# Patient Record
Sex: FEMALE | Race: OTHER
[De-identification: names, ages, dates, MRNs, and addresses within clinical notes are randomized per-mention and may not be internally consistent; named-entity substitution may affect disease eponyms.]

---

## 2021-04-14 ENCOUNTER — HOSPITAL ENCOUNTER (EMERGENCY)
Dept: HOSPITAL 61 - ER | Age: 21
Discharge: HOME | End: 2021-04-14
Payer: SELF-PAY

## 2021-04-14 VITALS — WEIGHT: 202.83 LBS | BODY MASS INDEX: 33.79 KG/M2 | HEIGHT: 65 IN

## 2021-04-14 VITALS — DIASTOLIC BLOOD PRESSURE: 57 MMHG | SYSTOLIC BLOOD PRESSURE: 103 MMHG

## 2021-04-14 DIAGNOSIS — N83.202: Primary | ICD-10-CM

## 2021-04-14 LAB
ALBUMIN SERPL-MCNC: 3.2 G/DL (ref 3.4–5)
ALBUMIN/GLOB SERPL: 0.7 {RATIO} (ref 1–1.7)
ALP SERPL-CCNC: 117 U/L (ref 46–116)
ALT SERPL-CCNC: 17 U/L (ref 14–59)
ANION GAP SERPL CALC-SCNC: 11 MMOL/L (ref 6–14)
ANISOCYTOSIS BLD QL SMEAR: PRESENT
APTT PPP: YELLOW S
AST SERPL-CCNC: 13 U/L (ref 15–37)
BACTERIA #/AREA URNS HPF: (no result) /HPF
BASOPHILS # BLD AUTO: 0 X10^3/UL (ref 0–0.2)
BASOPHILS NFR BLD: 1 % (ref 0–3)
BILIRUB SERPL-MCNC: 0.2 MG/DL (ref 0.2–1)
BILIRUB UR QL STRIP: NEGATIVE
BUN SERPL-MCNC: 4 MG/DL (ref 7–20)
BUN/CREAT SERPL: 6 (ref 6–20)
CALCIUM SERPL-MCNC: 8.6 MG/DL (ref 8.5–10.1)
CHLORIDE SERPL-SCNC: 106 MMOL/L (ref 98–107)
CO2 SERPL-SCNC: 23 MMOL/L (ref 21–32)
CREAT SERPL-MCNC: 0.7 MG/DL (ref 0.6–1)
EOSINOPHIL NFR BLD: 0 X10^3/UL (ref 0–0.7)
EOSINOPHIL NFR BLD: 1 % (ref 0–3)
ERYTHROCYTE [DISTWIDTH] IN BLOOD BY AUTOMATED COUNT: 17.5 % (ref 11.5–14.5)
FIBRINOGEN PPP-MCNC: CLEAR MG/DL
GFR SERPLBLD BASED ON 1.73 SQ M-ARVRAT: 107.8 ML/MIN
GLUCOSE SERPL-MCNC: 86 MG/DL (ref 70–99)
HCT VFR BLD CALC: 30.4 % (ref 36–47)
HGB BLD-MCNC: 9.4 G/DL (ref 12–15.5)
HYPOCHROMIA BLD QL SMEAR: PRESENT
LYMPHOCYTES # BLD: 2.2 X10^3/UL (ref 1–4.8)
LYMPHOCYTES NFR BLD AUTO: 27 % (ref 24–48)
MCH RBC QN AUTO: 21 PG (ref 25–35)
MCHC RBC AUTO-ENTMCNC: 31 G/DL (ref 31–37)
MCV RBC AUTO: 69 FL (ref 79–100)
MICROCYTES BLD QL SMEAR: PRESENT
MONO #: 0.8 X10^3/UL (ref 0–1.1)
MONOCYTES NFR BLD: 10 % (ref 0–9)
NEUT #: 5.2 X10^3/UL (ref 1.8–7.7)
NEUTROPHILS NFR BLD AUTO: 62 % (ref 31–73)
NITRITE UR QL STRIP: NEGATIVE
PH UR STRIP: 6 [PH]
PLATELET # BLD AUTO: 446 X10^3/UL (ref 140–400)
PLATELET # BLD EST: (no result) 10*3/UL
POLYCHROMASIA BLD QL SMEAR: PRESENT
POTASSIUM SERPL-SCNC: 3.7 MMOL/L (ref 3.5–5.1)
PROT SERPL-MCNC: 7.5 G/DL (ref 6.4–8.2)
PROT UR STRIP-MCNC: NEGATIVE MG/DL
RBC # BLD AUTO: 4.43 X10^6/UL (ref 3.5–5.4)
RBC #/AREA URNS HPF: (no result) /HPF (ref 0–2)
SODIUM SERPL-SCNC: 140 MMOL/L (ref 136–145)
UROBILINOGEN UR-MCNC: 0.2 MG/DL
WBC # BLD AUTO: 8.3 X10^3/UL (ref 4–11)
WBC #/AREA URNS HPF: (no result) /HPF (ref 0–4)

## 2021-04-14 PROCEDURE — 85025 COMPLETE CBC W/AUTO DIFF WBC: CPT

## 2021-04-14 PROCEDURE — 81025 URINE PREGNANCY TEST: CPT

## 2021-04-14 PROCEDURE — 80053 COMPREHEN METABOLIC PANEL: CPT

## 2021-04-14 PROCEDURE — 81001 URINALYSIS AUTO W/SCOPE: CPT

## 2021-04-14 PROCEDURE — 99285 EMERGENCY DEPT VISIT HI MDM: CPT

## 2021-04-14 PROCEDURE — 36415 COLL VENOUS BLD VENIPUNCTURE: CPT

## 2021-04-14 PROCEDURE — 96374 THER/PROPH/DIAG INJ IV PUSH: CPT

## 2021-04-14 PROCEDURE — 74177 CT ABD & PELVIS W/CONTRAST: CPT

## 2021-04-14 PROCEDURE — 87086 URINE CULTURE/COLONY COUNT: CPT

## 2021-04-14 PROCEDURE — 76856 US EXAM PELVIC COMPLETE: CPT

## 2021-04-14 PROCEDURE — 83735 ASSAY OF MAGNESIUM: CPT

## 2021-04-14 RX ADMIN — KETOROLAC TROMETHAMINE ONE MG: 30 INJECTION, SOLUTION INTRAMUSCULAR at 12:32

## 2021-04-14 RX ADMIN — IOHEXOL ONE ML: 300 INJECTION, SOLUTION INTRAVENOUS at 13:08

## 2021-04-14 NOTE — PHYS DOC
Past Medical History


Past Medical History:  No Pertinent History


Past Surgical History:  No Surgical History


Smoking Status:  Never Smoker


Alcohol Use:  None





General Adult


EDM:


Chief Complaint:  ABDOMINAL PAIN





HPI:


HPI:





Patient is a 20  year old female who presented to ER due to low abdominal pain 

on the left side since yesterday. Patient denies any nausea vomiting. Patient 

states she also have her menstrual PERIOD daughter 2 days ago. Patient denies 

any fever, no cough, no nausea vomiting. Patient states she is spotting at this 

time. Patient described the pain at sharp and aching in nature.





Review of Systems:


Review of Systems:


Constitutional:   Denies fever or chills. []


Eyes:   Denies change in visual acuity. []


HENT:   Denies nasal congestion or sore throat. [] 


Respiratory:   Denies cough or shortness of breath. [] 


Cardiovascular:   Denies chest pain or edema. [] 


GI:   Positive for lower abdominal pain associated with no nausea vomiting or 

diarrhea.


:  Denies dysuria. [] 


Musculoskeletal:   Denies back pain or joint pain. [] 


Integument:   Denies rash. [] 


Neurologic:   Denies headache, focal weakness or sensory changes. [] 


Endocrine:   Denies polyuria or polydipsia. [] 


Lymphatic:  Denies swollen glands. [] 


Psychiatric:  Denies depression or anxiety. []





Heart Score:


C/O Chest Pain:  N/A


Risk Factors:


Risk Factors:  DM, Current or recent (<one month) smoker, HTN, HLP, family 

history of CAD, obesity.


Risk Scores:


Score 0 - 3:  2.5% MACE over next 6 weeks - Discharge Home


Score 4 - 6:  20.3% MACE over next 6 weeks - Admit for Clinical Observation


Score 7 - 10:  72.7% MACE over next 6 weeks - Early Invasive Strategies





Allergies:


Allergies:





Allergies








Coded Allergies Type Severity Reaction Last Updated Verified


 


  No Known Drug Allergies    2/10/18 No











Physical Exam:


PE:





Constitutional: Well developed, well nourished, no acute distress, non-toxic 

appearance. []


HENT: Normocephalic, atraumatic, bilateral external ears normal, oropharynx 

moist, no oral exudates, nose normal. []


Eyes: PERRLA, EOMI, conjunctiva normal, no discharge. [] 


Neck: Normal range of motion, no tenderness, supple, no stridor. [] 


Cardiovascular:Heart rate regular rhythm, no murmur []


Lungs & Thorax:  Bilateral breath sounds clear to auscultation []


Abdomen: Bowel sounds normal, soft, there is tenderness in lower abdominal area,

 no masses, no pulsatile masses. [] 


Skin: Warm, dry, no erythema, no rash. [] 


Back: No tenderness, no CVA tenderness. [] 


Extremities: No tenderness, no cyanosis, no clubbing, ROM intact, no edema. [] 


Neurologic: Alert and oriented X 3, normal motor function, normal sensory 

function, no focal deficits noted. []


Psychologic: Affect normal, judgement normal, mood normal. []





Current Patient Data:


Labs:





                                Laboratory Tests








Test


 21


12:00


 


POC Urine HCG, Qualitative


 Hcg negative


(Negative)








Vital Signs:





                                   Vital Signs








  Date Time  Temp Pulse Resp B/P (MAP) Pulse Ox O2 Delivery O2 Flow Rate FiO2


 


21 11:51 97.9 86 18 116/72 (87) 99 Room Air  





 97.9       











EKG:


EKG:


[]





Radiology/Procedures:


Radiology/Procedures:


                            [facility]


                    [address] [place] [address]


                                 [phone]


                                        


                                 IMAGING REPORT





                                     Signed





PATIENT: JOSÉ DOUGLAS MACCOUNT: [id number]     MRN#: [id number]


: 2000           LOCATION: ER              AGE: 20


SEX: F                    EXAM DT: 21         ACCESSION#: [id number]


STATUS: REG ER            ORD. PHYSICIAN: JIA NEVILLE DO


REASON: LOWER ABDOMINAL PAIN SINCE YESTERDAY


PROCEDURE: CT ABD PELV W/ IV CONTRST ONLY





Exam Date:  2021 12:57 PM





CT ABDOMEN+PELVIS W





Indication: Reason: LOWER ABDOMINAL PAIN SINCE YESTERDAY / Spl. Instructions: 

[id number] 75ML / History:  





TECHNIQUE:  CT examination of the abdomen and pelvis was performed following the

 administration of nonionic intravenous contrast.  One or more of the following 

dose reduction techniques were utilized:


*Automated exposure control (AEC)


*Adjustment of mA and/or kV according to patient size


*Use of iterative reconstruction technique


*CT scan done according to ALARA, or ALARA/IMAGE GENTLY





FINDINGS: 





The visualized lung bases are clear.    





The liver, gallbladder, spleen, pancreas, adrenal glands and kidneys are normal.





Urinary bladder is normal in appearance. There is a 4.5 cm left adnexal cyst. 

Trace free fluid in the pelvis is nonspecific, likely physiologic.





There is no bowel obstruction or inflammation. No evidence for acute 

appendicitis.  





No significant atherosclerotic calcifications are seen.  No lymphadenopathy is 

seen.  





Osseous structures are intact.





IMPRESSION: 





4.5 cm left adnexal cyst noted. Otherwise no evidence of acute intra-abdominal 

pathology.  





Electronically signed by: Emilie Lozada MD (2021 1:29 PM) [id number]














DICTATED and SIGNED BY:     EMILIE LOZADA MD


DATE:     21 [id number] 0














[][facility]


                    [address]  [place], [place] [address] [phone]


                                        


                                 IMAGING REPORT





                                     Signed





PATIENT: JOSÉ DOUGLAS MACCOUNT: [id number]     MRN#: [id number]


: 2000           LOCATION: ER              AGE: 20


SEX: F                    EXAM DT: 21         ACCESSION#: [id number]


STATUS: REG ER            ORD. PHYSICIAN: JIA NEVILLE DO


REASON: left side pelvic pain


PROCEDURE: PELVIS ULTRASOUND





US PELVIS COMPLETE





History: Reason: left side pelvic pain / Spl. Instructions:  / History: 





Comparison: CT 2021





Technique: Grayscale and color Doppler imaging of the pelvis was performed using

 transabdominal technique.





Findings:


The uterus measures 8.7 x 5.5 x 4.5 cm.  Uterus has an unremarkable appearance. 

 The endometrial stripe measures 6 mm.





Right ovary measures 4.5 x 2.3 x 2.1 cm.  





Left ovary measures 6.4 x 4.3 x 4.0 cm. Left ovarian cyst measures 5.6 cm.





Normal Doppler flow to the ovaries. No adnexal masses are seen.





IMPRESSION:


1.  Left ovarian cyst. Recommend one-year follow-up.





Electronically signed by: Nathanael Lagos DO (2021 1:58 PM) [id number]














DICTATED and SIGNED BY:     NATHANAEL LAGOS DO


DATE:     21 [id number] 0





Course & Med Decision Making:


Course & Med Decision Making


Pertinent Labs and Imaging studies reviewed. (See chart for details)





Patient is a 20-year-old female who presented to ER due to left lower abdominal 

pain, CT scan her abdomen pelvic show a large adnexa cysts on the left side, 

pelvic ultrasound showed that she had a 5.6 cm ovarian cyst, no evidence of 

torsion.  Patient was given Toradol IV in ER for pain control, patient felt much

 better.  Patient will be discharged home.





Dragon Disclaimer:


Dragon Disclaimer:


This electronic medical record was generated, in whole or in part, using a voice

 recognition dictation system.





Departure


Departure


Impression:  


   Primary Impression:  


   Ovarian cyst


Disposition:   HOME / SELF CARE / HOMELESS


Condition:  STABLE


Referrals:  


NO PCP (PCP)








REA KIRBY Jr, MD


Please follow up with this OB/GYN doctor for outpatient follow up next week.


Patient Instructions:  Ovarian Cyst





Additional Instructions:  


Thank you for visiting our Emergency Department. We appreciate you trusting us 

with your care. If any additional problems come up don't hesitate to return to 

visit us. Please follow up with your primary care provider so they can plan 

additional care if needed and know about the problem that you had. If symptoms 

worsen come back to the Emergency Department. Any concerning symptoms that start

 such as chest pain, shortness of air, weakness or numbness on one side of the 

body, running high fevers or any other concerning symptoms return to the ER.


Scripts


Naproxen Sodium (ANAPROX DS) 550 Mg Tablet


1 TAB PO BID PRN for PAIN for 15 Days, #30 TAB 0 Refills


   Prov: JIA NEVILLE DO         21











JIA NEVILLE DO                2021 12:23

## 2021-04-14 NOTE — RAD
US PELVIS COMPLETE



History: Reason: left side pelvic pain / Spl. Instructions:  / History: 



Comparison: CT April 14, 2021



Technique: Grayscale and color Doppler imaging of the pelvis was performed using transabdominal techn
ique.



Findings:

The uterus measures 8.7 x 5.5 x 4.5 cm.  Uterus has an unremarkable appearance.  The endometrial stri
pe measures 6 mm.



Right ovary measures 4.5 x 2.3 x 2.1 cm.  



Left ovary measures 6.4 x 4.3 x 4.0 cm. Left ovarian cyst measures 5.6 cm.



Normal Doppler flow to the ovaries. No adnexal masses are seen.



IMPRESSION:

1.  Left ovarian cyst. Recommend one-year follow-up.



Electronically signed by: Nathanael Lagos DO (4/14/2021 1:58 PM) [id number]

## 2021-04-14 NOTE — RAD
Exam Date:  4/14/2021 12:57 PM



CT ABDOMEN+PELVIS W



Indication: Reason: LOWER ABDOMINAL PAIN SINCE YESTERDAY / Spl. Instructions: [id number] 75ML / History:
  



TECHNIQUE:  CT examination of the abdomen and pelvis was performed following the administration of no
nionic intravenous contrast.  One or more of the following dose reduction techniques were utilized:

*Automated exposure control (AEC)

*Adjustment of mA and/or kV according to patient size

*Use of iterative reconstruction technique

*CT scan done according to ALARA, or ALARA/IMAGE GENTLY



FINDINGS: 



The visualized lung bases are clear.    



The liver, gallbladder, spleen, pancreas, adrenal glands and kidneys are normal.



Urinary bladder is normal in appearance. There is a 4.5 cm left adnexal cyst. Trace free fluid in the
 pelvis is nonspecific, likely physiologic.



There is no bowel obstruction or inflammation. No evidence for acute appendicitis.  



No significant atherosclerotic calcifications are seen.  No lymphadenopathy is seen.  



Osseous structures are intact.



IMPRESSION: 



4.5 cm left adnexal cyst noted. Otherwise no evidence of acute intra-abdominal pathology.  



Electronically signed by: Dante Lozada MD (4/14/2021 1:29 PM) [id number]

## 2021-07-15 ENCOUNTER — HOSPITAL ENCOUNTER (OUTPATIENT)
Dept: HOSPITAL 61 - SURG | Age: 21
Discharge: HOME | End: 2021-07-15
Attending: OBSTETRICS & GYNECOLOGY
Payer: SELF-PAY

## 2021-07-15 VITALS — DIASTOLIC BLOOD PRESSURE: 58 MMHG | SYSTOLIC BLOOD PRESSURE: 106 MMHG

## 2021-07-15 VITALS — BODY MASS INDEX: 33.06 KG/M2 | HEIGHT: 65 IN | WEIGHT: 198.42 LBS

## 2021-07-15 DIAGNOSIS — Z88.8: ICD-10-CM

## 2021-07-15 DIAGNOSIS — E66.9: ICD-10-CM

## 2021-07-15 DIAGNOSIS — Z98.890: ICD-10-CM

## 2021-07-15 DIAGNOSIS — N83.202: ICD-10-CM

## 2021-07-15 DIAGNOSIS — Z79.899: ICD-10-CM

## 2021-07-15 DIAGNOSIS — N83.8: Primary | ICD-10-CM

## 2021-07-15 PROCEDURE — A6219 GAUZE <= 16 SQ IN W/BORDER: HCPCS

## 2021-07-15 PROCEDURE — 58662 LAPAROSCOPY EXCISE LESIONS: CPT

## 2021-07-15 PROCEDURE — 81025 URINE PREGNANCY TEST: CPT

## 2021-07-15 PROCEDURE — A4930 STERILE, GLOVES PER PAIR: HCPCS

## 2021-07-15 PROCEDURE — A4223 INFUSION SUPPLIES W/O PUMP: HCPCS

## 2021-07-15 PROCEDURE — 88304 TISSUE EXAM BY PATHOLOGIST: CPT

## 2021-07-15 RX ADMIN — FENTANYL CITRATE PRN MCG: 50 INJECTION INTRAMUSCULAR; INTRAVENOUS at 12:04

## 2021-07-15 RX ADMIN — FENTANYL CITRATE PRN MCG: 50 INJECTION INTRAMUSCULAR; INTRAVENOUS at 11:47

## 2021-07-15 NOTE — OP
DATE OF SURGERY: 07/15/2021

PREOPERATIVE DIAGNOSIS:  Left ovarian cyst.



POSTOPERATIVE DIAGNOSIS:  Left paratubal cyst.



PROCEDURE:  Laparoscopic left paratubal cystectomy.



SURGEON:  Dr. Karen Malin.



ANESTHESIA:  GETA.



ESTIMATED BLOOD LOSS:  10 mL.



COMPLICATIONS:  None.



FINDINGS:  Normal uterus, normal ovaries bilaterally.  Normal right fallopian 

tube.  Left paratubal cyst, 6 cm size.



SUMMARY:  This is a 20-year-old who presented with persistent suspected left 

ovarian cyst and abdominal pain.  She was counseled on the risks, benefits and 

expectations of laparoscopic left ovarian cystectomy and voiced clear 

understanding to proceed.



DESCRIPTION OF PROCEDURE:  The patient was taken to surgery suite and placed in 

dorsal lithotomy position, was prepped with Betadine solution for vaginal prep 

and ChloraPrep for abdominal prep.  After adequate anesthesia, bivalve speculum 

was placed vaginally.  The anterior lip of the cervix was grasped with a single 

tooth tenaculum.  Uterine acorn manipulator was then placed.  The bivalve 

speculum was removed.  Attention was now placed on the abdomen.



A small transverse skin incision was made just below the umbilicus with a 

scalpel.  The Veress needle was then placed through the infraumbilical incision 

site.  The abdomen was allowed to insufflate up to 1.5 liters of CO2 gas.  The 

Veress needle was then removed.  A 5 mm trocar was placed.  Scope was 

positioned.  The uterus appeared normal size.  The right fallopian tube and 

ovary appeared normal.  The left ovary appeared normal.  There was a left 

paratubal cyst of 6 cm size.  Two additional incisions were made in the left 

lower quadrant, through which a 5 mm trocar and an 8 mm trocar was placed with 

the aid of Cesar graspers.  The left paratubal cyst was then grasped and 

isolated with the aid of EnSeal device.  The paratubal cyst wall was dissected 

away from the fallopian tube and entered with the EnSeal device.  Suction 

irrigation was utilized to decompress the left paratubal cyst.  The remainder of

the left paratubal cyst was removed with blunt dissection and the aid of EnSeal 

device.  The fallopian tube was intact and without any injury.  The left 

paratubal cyst wall was removed.  Suction irrigation was utilized to verify good

hemostasis.  A small amount of normal saline was left in the posterior 

cul-de-sac.  The trocars were then removed under direct visualization.  The 

abdomen was allowed to deflate as much as possible along with mechanical 

manipulation.  Three skin incisions were reapproximated using 4-0 Vicryl suture 

in subcuticular manner.  0.25% Marcaine with epinephrine was injected at each 

incision site.  The uterine acorn manipulator and single tooth tenaculum were 

then removed.  The patient tolerated the procedure well and was taken to 

recovery room in stable condition.  Sponge and needle count correct x 3.







SUNIL

DR: Cem   DD: 07/15/2021 10:59

DT: 07/15/2021 12:05   TID: [id number]

## 2021-07-15 NOTE — DISCH
DISCHARGE INSTRUCTIONS


Condition on Discharge


Condition on Discharge:  Stable





Activity After Discharge


Activity Instructions for Disc:  Activity as tolerated


Lifting Instructions after Dis:  No heavy lifting


Driving Instructions after Dis:  Do not drive today





Diet after Discharge


Diet after Discharge:  Regular





Contacting the DRClaudette after DC


Call your doctor for:  Concerns you may have





Follow-Up


Follow up with:  Dr. Malin in 1 week











REA MALIN Jr, MD          Jul 15, 2021 11:03

## 2021-07-15 NOTE — PDOC
BRIEF OPERATIVE NOTE


Date:  Jul 15, 2021


Pre-Op Diagnosis


LOV Cyst


Post-Op Diagnosis


Left Paratubal Cyst


Procedure Performed


[facility] Left Paratubal Cystectomy


Surgeon


Dr. Malin


Anesthesia Type:  General


Blood Loss


10 ml


Specimens Obtained


Left paratubal cyst wall


Findings


nml uterus, nml ovaries jessie., nml Right fallopian tube; Left paratubal cyst 6 cm

size


Complications


none


Operative Note


see dictation











REA MALIN Jr, MD          Jul 15, 2021 10:59

## 2021-07-19 NOTE — PATHOLOGY
[facility] Accession Number: [id number]

.                                                                01

Material submitted:                                        .

ovary - PARATUBAL CYST. Modifiers: left, PARATUBAL

.                                                                01

Clinical history:                                          .

LEFT OVARIAN CYST

LEFT PARATUBAL CYSTECTOMY

.                                                                02

**********************************************************************

Diagnosis:

Left paratubal cystectomy:

- Paratubal cyst.

(JPM:[facility]; 07/19/2021)

[facility] [place]  07/19/2021  0851 Local

**********************************************************************

.                                                                02

Comment:

There is no evidence of malignancy.

(JPM:[facility]; 07/19/2021)

.                                                                02

Electronically signed:                                     .

Franc Matta MD, Pathologist

NPI- [id number]

.                                                                01

Gross description:                                         .

Received in formalin labeled "Florelsaabcarrillo, Nelia and paratubal cyst".

Received is a previously opened paratubal cyst measuring 3.0 x 0.2 x 1.5

cm with a wall thickness measuring 0.1 cm.  Sectioning reveals no other

grossly apparent lesions.  The specimen is representatively submitted in

cassette B1.([facility]; 7/16/2021)

[facility]  07/16/[facility]21  1804 Local

.                                                                02

Pathologist provided ICD-10:

N83.8

.                                                                02

CPT                                                        .

[id number]

Specimen Comment: A courtesy copy of this report has been sent to [phone]

Specimen Comment: Report sent to 

***Performed at:  01

   [facility]

   [address], [place]  [id number]

   MD Umberto Velasco MD Phone:  [phone]

***Performed at:  02

   [facility]

   [address] [place], [place]  [id number]

   MD Franc Matta MD Phone:  [phone]

## 2022-03-14 ENCOUNTER — HOSPITAL ENCOUNTER (EMERGENCY)
Dept: HOSPITAL 61 - ER | Age: 22
LOS: 1 days | Discharge: HOME | End: 2022-03-15
Payer: SELF-PAY

## 2022-03-14 VITALS — WEIGHT: 208.34 LBS | HEIGHT: 65 IN | BODY MASS INDEX: 34.71 KG/M2

## 2022-03-14 VITALS — SYSTOLIC BLOOD PRESSURE: 126 MMHG | DIASTOLIC BLOOD PRESSURE: 79 MMHG

## 2022-03-14 DIAGNOSIS — J06.9: ICD-10-CM

## 2022-03-14 DIAGNOSIS — Z91.018: ICD-10-CM

## 2022-03-14 DIAGNOSIS — H66.93: Primary | ICD-10-CM

## 2022-03-14 PROCEDURE — 99283 EMERGENCY DEPT VISIT LOW MDM: CPT

## 2022-03-15 NOTE — PHYS DOC
Past Medical History


Past Medical History:  No Pertinent History


Past Surgical History:  No Surgical History


Smoking Status:  Never Smoker


Alcohol Use:  None





General Adult


EDM:


Chief Complaint:  Congestion





HPI:


HPI:


21-year-old female who denies any significant past medical history, presents the

ED with her boyfriend, (patient consents to his/her/their knowledge and 

involvement in pts' medical care), complains of "nasal burning," nasal 

congestion and clogged ears (right ear worse than left) with associated chills 

and dizziness for the past 3 days.  Has been vaccinated for COVID.  Non-smoker. 

Has no underlying lung disease.  No known sick contacts.  Boyfriend is 

asymptomatic.





Review of Systems:


Review of Systems:


Constitutional:   Denies fever or confusion


Eyes:   Denies change in visual acuity. []


HENT:   Denies rhinorrhea or sore throat. [] 


Respiratory:   Denies cough or shortness of breath. [] 


Cardiovascular:   Denies chest pain or edema. [] 


GI:   Denies abdominal pain, nausea, vomiting, bloody stools or diarrhea. [] 


:  Denies dysuria or vaginal bleeding


Musculoskeletal:   Denies back pain or joint pain. [] 


Integument:   Denies rash or diaphoresis


Neurologic:   Denies headache, focal weakness or sensory changes. [] 


Endocrine:   Denies polyuria or polydipsia. [] 


Lymphatic:  Denies swollen glands. [] 


Psychiatric:  Denies depression or anxiety. []





Heart Score:


C/O Chest Pain:  No


Risk Factors:


Risk Factors:  DM, Current or recent (<one month) smoker, HTN, HLP, family 

history of CAD, obesity.


Risk Scores:


Score 0 - 3:  2.5% MACE over next 6 weeks - Discharge Home


Score 4 - 6:  20.3% MACE over next 6 weeks - Admit for Clinical Observation


Score 7 - 10:  72.7% MACE over next 6 weeks - Early Invasive Strategies





Allergies:


Allergies:





Allergies








Coded Allergies Type Severity Reaction Last Updated Verified


 


  pineapple Allergy Unknown Swelling 7/15/21 Yes











Physical Exam:


PE:





Constitutional: Well developed, well nourished, no acute distress, non-toxic 

appearance, 


HENT: Normocephalic, atraumatic, erythematous right tympanic membrane with 

effusion, mildly erythematous left tympanic membrane with no effusion, no 

pharyngeal erythema or exudates, 


Eyes: EOMI, conjunctiva normal, no discharge.  


Neck: Normal range of motion, supple, right anterior cervical lymphadenopathy


Cardiovascular: S1/2 present, regular rhythm


Lungs & Thorax: Speaking in full sentences, bilateral equal chest rise, no 

tachypnea or increased work of breathing, nasal voice


Skin: Warm, dry, no erythema, no rash. [] 


Extremities: No tenderness, no cyanosis, 


Neurologic: Alert and oriented X 3, normal motor function, normal sensory 

function, no focal deficits noted. []


Psychologic: Affect normal, judgement normal, mood normal. []





Current Patient Data:


Vital Signs:





                                   Vital Signs








  Date Time  Temp Pulse Resp B/P (MAP) Pulse Ox O2 Delivery O2 Flow Rate FiO2


 


3/14/22 22:45 98.5 87 17 126/79 (95) 96 Room Air  





 98.5       











EKG:


EKG:


[]





Radiology/Procedures:


Radiology/Procedures:


[]





Course & Med Decision Making:


Course & Med Decision Making


Pertinent Labs and Imaging studies reviewed. (See chart for details)





Concern for right otitis media, in the setting of upper respiratory infection.  

Patient is well-appearing, afebrile and tolerating oral intake.  Will prescribe 

Augmentin.  Will discharge home with strict ED return precautions were given for

 neck stiffness, blurry vision, headache or dehydration. Encouraged urgent 

outpatient follow-up with PMD for repeat evaluation.  Life-threatening processes

 were considered but are low suspicion at this time, given history, physical 

exam and ED workup. Pt was educated on all prescription medications and adverse 

effects.  All patient's questions were answered and pt was stable at time of 

discharge.





Life/limb-threatening differential includes but is not limited to, auricular 

hematoma or perichondritis, malignant otitis externa, otitis externa or media, 

otomycosis, bullous myringitis, mastoiditis, hearing loss or vestibular 

disorder, tympanic membrane rupture/perforation/barotrauma, herpes zoster oticus

, contact dermatitis, cholesteatoma, meningitis/encephalitis, brain abscess or 

venous/cavernous/cerebral sinus thrombosis.





I have spoken with the patient and/or caregivers.  I explained the patient's 

condition, diagnoses and treatment plan based on the information available to me

 at this time.  I have answered the patient and/or caregiver's questions and 

addressed any concerns.  The patient and/or caregivers have a good understanding

 of patient's diagnosis, condition and treatment plan as can be expected at this

 point.  Vital signs have been stable.  Patient's condition is stable and 

appropriate for discharge from the emergency department. 





Patient will pursue further outpatient evaluation with primary care physician or

 other designated or consulting physician as outlined in the discharge 

instructions.  The patient and/or caregivers are agreeable to this plan of care 

and follow-up instructions have been explained in detail.  The patient and/or 

caregivers have received these instructions in written form and have expressed 

an understanding of the discharge instructions.  The patient and/or caregivers 

are aware that any significant change of condition or worsening of symptoms 

should prompt immediate return to this or the closest emergency department or 

call to 911.





Marco Disclaimer:


Dragon Disclaimer:


This electronic medical record was generated, in whole or in part, using a voice

 recognition dictation system.





Departure


Departure


Impression:  


   Primary Impression:  


   Otitis media


   Additional Impression:  


   Upper respiratory infection


Disposition:  01 HOME / SELF CARE / HOMELESS


Condition:  STABLE


Referrals:  


JIMENA HUTCHISON MD (PCP)


Follow-up with your primary care physician in 24 to 48 hours OR


FOLLOW UP WITH FAMILY MEDICINE:


[address], [address]


[place] [address]


Phone: [phone]


Patient Instructions:  Otitis Media with Effusion, Upper Respiratory Infection, 

Adult





Additional Instructions:  


Return to ED immediately if your oxygen level drops below 90% (purchase a pulse 

oximetry at a medical supply store), difficulties breathing including rapid 

breathing or increased work of breathing (skin sucking under ribs), chest pain 

or stroke-like symptoms (facial droop, speech changes, arm/leg weakness).





EMERGENCY DEPARTMENT GENERAL DISCHARGE INSTRUCTIONS





Thank you for coming to [facility] Emergency Department (ED) 

today and 


trusting us with you care.  We trust that you had a positive experience in our 

Emergency 


Department.  If you wish to speak to the department management, you may call the

 Director at 


[phone].





YOUR FOLLOW UP INSTRUCTIONS ARE AS FOLLOWS:





1.  Do you have a private Doctor?  If you do not have a private doctor, please 

ask for a 


resource list of physicians or clinics that may be able to assist you with 

follow up care.





2.  The Emergency Physicain has interpreted your x-rays.  The X-Ray specialist 

will also 


review them.  If there is a change in the findings, you will be notified in 48 

hours when at 


all possible.





3.  A lab test or culture has been done, your results will be reviewed and you w

ill be 


notified if you need a change in treatment.





ADDITIONAL INSTRUCTIONS AND INFORMATION:





1.  Your care today has been supervised by a physician who is specially trained 

in emergency 


care.  Many problems require more than one evaluation for a complete diagnosis 

and 


treatment.  We recommend that you schedule your follow up appointment as 

recommended to 


ensure complete treatment of you illness or injury.  If you are unable to obtain

 follow up 


care and continue to have a problem, or if your condition worsens, we recommend 

that you 


return to the ED.





2.  We are not able to safely determine your condition over the phone nor are we

 able to 


give sound medical advice over the phone.  For these safety reasons, if you call

 for medical 


advice we will ask you to come to the ED for further evaluation.





3.  If you have any questions regarding these discharge instructions please call

 the ED at 


[phone].





SAFETY INFORMATION:





In the interest of safety, wellness, and injury prevention; we encourage you to 

wear your 


sealbelt, if you smoke; quite smoking, and we encourage family to use a 

protective helmet 


for bicycling and other sporting events that present an increased risk for head 

injury.





IF YOUR SYMPTOMS WORSEN OR NEW SYMPTOMS DEVELOP, OR YOU HAVE CONCERNS ABOUT YOUR

 CONDITION; 


OR IF YOUR CONDITION WORSENS WHILE YOU ARE WAITING FOR YOUR FOLLOW UP 

APPOINTMENT; EITHER 


CONTACT YOUR PRIMARY CARE DOCTOR, THE PHYSICIAN WHOSE NAME AND NUMBER YOU WERE 

GIVEN, OR 


RETURN TO THE ED IMMEDIATELY.


Scripts


Amoxicillin/Potassium Clav (AMOX TR-K -125 MG TAB) 1 Each Tablet


1 TAB PO BID for 10 Days, #20 TAB


   Prov: IGOR ACUÑA DO         3/15/22











IGOR ACUÑA DO               Mar 15, 2022 00:06

## 2022-05-09 ENCOUNTER — HOSPITAL ENCOUNTER (EMERGENCY)
Dept: HOSPITAL 61 - ER | Age: 22
Discharge: HOME | End: 2022-05-09
Payer: SELF-PAY

## 2022-05-09 VITALS — HEIGHT: 63 IN | BODY MASS INDEX: 36.84 KG/M2 | WEIGHT: 207.9 LBS

## 2022-05-09 VITALS — DIASTOLIC BLOOD PRESSURE: 55 MMHG | SYSTOLIC BLOOD PRESSURE: 93 MMHG

## 2022-05-09 DIAGNOSIS — O23.41: Primary | ICD-10-CM

## 2022-05-09 DIAGNOSIS — N83.201: ICD-10-CM

## 2022-05-09 DIAGNOSIS — Z91.018: ICD-10-CM

## 2022-05-09 DIAGNOSIS — Z3A.01: ICD-10-CM

## 2022-05-09 LAB
ALBUMIN SERPL-MCNC: 3.2 G/DL (ref 3.4–5)
ALBUMIN/GLOB SERPL: 0.7 {RATIO} (ref 1–1.7)
ALP SERPL-CCNC: 105 U/L (ref 46–116)
ALT SERPL-CCNC: 15 U/L (ref 14–59)
ANION GAP SERPL CALC-SCNC: 10 MMOL/L (ref 6–14)
AST SERPL-CCNC: 13 U/L (ref 15–37)
BACTERIA #/AREA URNS HPF: (no result) /HPF
BASOPHILS # BLD AUTO: 0.1 X10^3/UL (ref 0–0.2)
BASOPHILS NFR BLD: 1 % (ref 0–3)
BILIRUB SERPL-MCNC: 0.3 MG/DL (ref 0.2–1)
BUN SERPL-MCNC: 5 MG/DL (ref 7–20)
BUN/CREAT SERPL: 7 (ref 6–20)
CALCIUM SERPL-MCNC: 9.1 MG/DL (ref 8.5–10.1)
CHLORIDE SERPL-SCNC: 105 MMOL/L (ref 98–107)
CO2 SERPL-SCNC: 24 MMOL/L (ref 21–32)
CREAT SERPL-MCNC: 0.7 MG/DL (ref 0.6–1)
EOSINOPHIL NFR BLD: 0 X10^3/UL (ref 0–0.7)
EOSINOPHIL NFR BLD: 1 % (ref 0–3)
ERYTHROCYTE [DISTWIDTH] IN BLOOD BY AUTOMATED COUNT: 15.9 % (ref 11.5–14.5)
GFR SERPLBLD BASED ON 1.73 SQ M-ARVRAT: 106.7 ML/MIN
GLUCOSE SERPL-MCNC: 83 MG/DL (ref 70–99)
HCT VFR BLD CALC: 35.7 % (ref 36–47)
HGB BLD-MCNC: 11.7 G/DL (ref 12–15.5)
LYMPHOCYTES # BLD: 1.9 X10^3/UL (ref 1–4.8)
LYMPHOCYTES NFR BLD AUTO: 21 % (ref 24–48)
MCH RBC QN AUTO: 26 PG (ref 25–35)
MCHC RBC AUTO-ENTMCNC: 33 G/DL (ref 31–37)
MCV RBC AUTO: 80 FL (ref 79–100)
MONO #: 0.7 X10^3/UL (ref 0–1.1)
MONOCYTES NFR BLD: 7 % (ref 0–9)
NEUT #: 6.4 X10^3/UL (ref 1.8–7.7)
NEUTROPHILS NFR BLD AUTO: 70 % (ref 31–73)
PLATELET # BLD AUTO: 291 X10^3/UL (ref 140–400)
POTASSIUM SERPL-SCNC: 3.5 MMOL/L (ref 3.5–5.1)
PROT SERPL-MCNC: 7.8 G/DL (ref 6.4–8.2)
RBC # BLD AUTO: 4.49 X10^6/UL (ref 3.5–5.4)
RBC #/AREA URNS HPF: 0 /HPF (ref 0–2)
SODIUM SERPL-SCNC: 139 MMOL/L (ref 136–145)
WBC # BLD AUTO: 9.1 X10^3/UL (ref 4–11)
WBC #/AREA URNS HPF: (no result) /HPF (ref 0–4)

## 2022-05-09 PROCEDURE — 80053 COMPREHEN METABOLIC PANEL: CPT

## 2022-05-09 PROCEDURE — 99284 EMERGENCY DEPT VISIT MOD MDM: CPT

## 2022-05-09 PROCEDURE — 36415 COLL VENOUS BLD VENIPUNCTURE: CPT

## 2022-05-09 PROCEDURE — 81025 URINE PREGNANCY TEST: CPT

## 2022-05-09 PROCEDURE — 96375 TX/PRO/DX INJ NEW DRUG ADDON: CPT

## 2022-05-09 PROCEDURE — 96361 HYDRATE IV INFUSION ADD-ON: CPT

## 2022-05-09 PROCEDURE — 87077 CULTURE AEROBIC IDENTIFY: CPT

## 2022-05-09 PROCEDURE — 84702 CHORIONIC GONADOTROPIN TEST: CPT

## 2022-05-09 PROCEDURE — 76817 TRANSVAGINAL US OBSTETRIC: CPT

## 2022-05-09 PROCEDURE — 85025 COMPLETE CBC W/AUTO DIFF WBC: CPT

## 2022-05-09 PROCEDURE — 81001 URINALYSIS AUTO W/SCOPE: CPT

## 2022-05-09 PROCEDURE — 87186 SC STD MICRODIL/AGAR DIL: CPT

## 2022-05-09 PROCEDURE — 87086 URINE CULTURE/COLONY COUNT: CPT

## 2022-05-09 PROCEDURE — 96374 THER/PROPH/DIAG INJ IV PUSH: CPT

## 2022-05-09 NOTE — PHYS DOC
Past Medical History


Past Medical History:  No Pertinent History


Past Surgical History:  No Surgical History


Smoking Status:  Never Smoker


Alcohol Use:  None





General Adult


EDM:


Chief Complaint:  VOMITING IN PREGNANCY





HPI:


HPI:





Patient is a 21  year old female  2 para 1 currently 6 weeks pregnant 

presenting to the ED today with nausea and vomiting for 4 days.  Patient denies 

any fever.  Reports generalized lower abdominal pain described as mild and 

crampy.  Denies any vaginal bleeding.  Denies any concerns for STDs or unusual 

vaginal discharge.  Denies anything specifically exacerbating or relieving her 

abdominal pain but she states it is intermittent.





Review of Systems:


Review of Systems:


Constitutional:   Denies fever or chills. []


Eyes:   Denies change in visual acuity. []


HENT:   Denies nasal congestion or sore throat. [] 


Respiratory:   Denies cough or shortness of breath. [] 


Cardiovascular:   Denies chest pain or edema. [] 


GI:   Reports lower abdominal pain, nausea and vomiting


:  Denies dysuria. [] 


Musculoskeletal:   Denies back pain or joint pain. [] 


Integument:   Denies rash. [] 


Neurologic:   Denies headache, focal weakness or sensory changes. [] 


Psychiatric:  Denies depression or anxiety. []





Heart Score:


C/O Chest Pain:  N/A


Risk Factors:


Risk Factors:  DM, Current or recent (<one month) smoker, HTN, HLP, family 

history of CAD, obesity.


Risk Scores:


Score 0 - 3:  2.5% MACE over next 6 weeks - Discharge Home


Score 4 - 6:  20.3% MACE over next 6 weeks - Admit for Clinical Observation


Score 7 - 10:  72.7% MACE over next 6 weeks - Early Invasive Strategies





Current Medications:





Current Medications








 Medications


  (Trade)  Dose


 Ordered  Sig/Bette  Start Time


 Stop Time Status Last Admin


Dose Admin


 


 Ceftriaxone Sodium


  (Rocephin)  1 gm  1X  ONCE  22 19:30


 22 19:31 DC 22 19:30


1 GM


 


 Ondansetron HCl


  (Zofran)  4 mg  1X  ONCE  22 17:15


 22 17:16 DC 22 17:51


4 MG


 


 Sodium Chloride  1,000 ml @ 


 1,000 mls/hr  1X  ONCE  22 17:15


 22 18:14 DC 22 17:50


1,000 MLS/HR











Allergies:


Allergies:





Allergies








Coded Allergies Type Severity Reaction Last Updated Verified


 


  pineapple Allergy Unknown Swelling 7/15/21 Yes











Physical Exam:


PE:





Constitutional: Well developed, well nourished, no acute distress, non-toxic 

appearance. []


HENT: Normocephalic, atraumatic, bilateral external ears normal, oropharynx 

moist, no oral exudates, nose normal. []


Eyes: PERRLA, EOMI, conjunctiva normal, no discharge. [] 


Neck: Normal range of motion, no tenderness, supple, no stridor. [] 


Cardiovascular:Heart rate regular rhythm, no murmur []


Lungs & Thorax:  Bilateral breath sounds clear to auscultation []


Abdomen: Bowel sounds normal, soft, no tenderness, no masses, no pulsatile 

masses. [] 


Skin: Warm, dry, no erythema, no rash. [] 


Back: No tenderness, no CVA tenderness. [] 


Extremities: No tenderness, no cyanosis, no clubbing, ROM intact, no edema. [] 


Neurologic: Alert and oriented X 3, normal motor function, normal sensory 

function, no focal deficits noted. []


Psychologic: Affect normal, judgement normal, mood normal. []





Current Patient Data:


Labs:





                                Laboratory Tests








Test


 22


17:15 22


17:25 22


17:46


 


Urine Collection Type Unknown    


 


Urine Color (Auto) Yellow    


 


Urine Turbidity Hazy    


 


Urine pH (Auto)


 7.0 (<5.0-8.0)


 


 





 


Urine Specific Gravity


 1.027


(1.000-1.030) 


 





 


Urine Protein (Auto)


 30 mg/dL


(Negative) 


 





 


Urine Glucose (Auto)(UA)


 Negative mg/dL


(Negative) 


 





 


Urine Ketones (Auto)


 Trace mg/dL


(Negative) 


 





 


Urine Blood (Auto)


 Negative


(Negative) 


 





 


Urine Nitrite


 Negative


(Negative) 


 





 


Urine Bilirubin (Auto)


 Negative


(Negative) 


 





 


Urine Urobilinogen (Auto)


 6 mg/dL


(Normal) 


 





 


Urine Leukocyte Esterase


(Auto) Large


(Negative) 


 





 


Urine RBC 0 /HPF (0-2)    


 


Urine WBC


 5-10 /HPF


(0-4) 


 





 


Urine Squamous Epithelial


Cells Mod /LPF  


 


 





 


Urine Bacteria


 Moderate /HPF


(0-FEW) 


 





 


Urine Mucus Mod /LPF    


 


POC Urine HCG, Qualitative


 


 Hcg positive


(Negative) 





 


White Blood Count


 


 


 9.1 x10^3/uL


(4.0-11.0)


 


Red Blood Count


 


 


 4.49 x10^6/uL


(3.50-5.40)


 


Hemoglobin


 


 


 11.7 g/dL


(12.0-15.5)  L


 


Hematocrit


 


 


 35.7 %


(36.0-47.0)  L


 


Mean Corpuscular Volume


 


 


 80 fL ()





 


Mean Corpuscular Hemoglobin   26 pg (25-35)  


 


Mean Corpuscular Hemoglobin


Concent 


 


 33 g/dL


(31-37)


 


Red Cell Distribution Width


 


 


 15.9 %


(11.5-14.5)  H


 


Platelet Count


 


 


 291 x10^3/uL


(140-400)


 


Neutrophils (%) (Auto)   70 % (31-73)  


 


Lymphocytes (%) (Auto)   21 % (24-48)  L


 


Monocytes (%) (Auto)   7 % (0-9)  


 


Eosinophils (%) (Auto)   1 % (0-3)  


 


Basophils (%) (Auto)   1 % (0-3)  


 


Neutrophils # (Auto)


 


 


 6.4 x10^3/uL


(1.8-7.7)


 


Lymphocytes # (Auto)


 


 


 1.9 x10^3/uL


(1.0-4.8)


 


Monocytes # (Auto)


 


 


 0.7 x10^3/uL


(0.0-1.1)


 


Eosinophils # (Auto)


 


 


 0.0 x10^3/uL


(0.0-0.7)


 


Basophils # (Auto)


 


 


 0.1 x10^3/uL


(0.0-0.2)


 


Maternal Serum HCG Beta


Subunit 


 


 51611 mIU/mL


(0-5)  H


 


Sodium Level


 


 


 139 mmol/L


(136-145)


 


Potassium Level


 


 


 3.5 mmol/L


(3.5-5.1)


 


Chloride Level


 


 


 105 mmol/L


()


 


Carbon Dioxide Level


 


 


 24 mmol/L


(21-32)


 


Anion Gap   10 (6-14)  


 


Blood Urea Nitrogen


 


 


 5 mg/dL (7-20)


L


 


Creatinine


 


 


 0.7 mg/dL


(0.6-1.0)


 


Estimated GFR


(Cockcroft-Gault) 


 


 106.7  





 


BUN/Creatinine Ratio   7 (6-20)  


 


Glucose Level


 


 


 83 mg/dL


(70-99)


 


Calcium Level


 


 


 9.1 mg/dL


(8.5-10.1)


 


Total Bilirubin


 


 


 0.3 mg/dL


(0.2-1.0)


 


Aspartate Amino Transferase


(AST) 


 


 13 U/L (15-37)


L


 


Alanine Aminotransferase (ALT)


 


 


 15 U/L (14-59)





 


Alkaline Phosphatase


 


 


 105 U/L


()


 


Total Protein


 


 


 7.8 g/dL


(6.4-8.2)


 


Albumin


 


 


 3.2 g/dL


(3.4-5.0)  L


 


Albumin/Globulin Ratio


 


 


 0.7 (1.0-1.7)


L





                                Laboratory Tests


22 17:46








                                Laboratory Tests


22 17:46








Vital Signs:





                                   Vital Signs








  Date Time  Temp Pulse Resp B/P (MAP) Pulse Ox O2 Delivery O2 Flow Rate FiO2


 


22 17:41  88 18 113/60 (77) 99   


 


22 16:35 98.8       





 98.8       











EKG:


EKG:


[]





Radiology/Procedures:


Radiology/Procedures:


[]





Course & Med Decision Making:


Course & Med Decision Making


Pertinent Labs and Imaging studies reviewed. (See chart for details)





This a 21-year-old female patient presenting to the ED today complaining of 

nausea, vomiting and abdominal pain in pregnancy, symptoms for 4 days.  She is a

  2 para 1.





CBC with a normal WBC, hemoglobin 11.7, hematocrit 35.7, CMP with no acute 

findings, beta-hCG 27,528.  Urine positive for large amount of leukocytes.  OB 

ultrasound noted for an IUP 6 Wks 0 days , right ovary with a complex cyst

 3 .1 cm, bilateral ovary flow seen.  Patient has an OB/GYN.  Instructed him to 

contact the office and follow-up as soon as possible.  Provided return 

precautions.





Dragon Disclaimer:


Dragon Disclaimer:


This electronic medical record was generated, in whole or in part, using a voice

 recognition dictation system.





Departure


Departure


Impression:  


   Primary Impression:  


   Urinary tract infection during pregnancy


   Qualified Codes:  O23.41 - Unspecified infection of urinary tract in 

   pregnancy, first trimester


   Additional Impressions:  


   Ovarian cyst


   Qualified Codes:  N83.201 - Unspecified ovarian cyst, right side


   Nausea and vomiting during pregnancy


Disposition:  01 HOME / SELF CARE / HOMELESS


Condition:  STABLE


Referrals:  


JIMENA HUTCHISON MD (PCP)








LEV SALES MD


Please follow-up with your OB/GYN as soon as possible


Patient Instructions:  Ovarian Cyst, Easy-to-Read, Pregnancy - Urinary Tract 

Infection





Additional Instructions:  


You were evaluated in the emergency room and noted to have urinary tract 

infection in pregnancy.  Take the prescribed antibiotics until completed.  You 

also have a cyst in your right ovary.  Please contact your OB/GYN tomorrow 

morning and set up a follow-up appointment in the clinic


Scripts


Ondansetron (ONDANSETRON ODT) 4 Mg Tab.rapdis


1 TAB PO PRN Q6-8HRS, #30 TAB


   Prov: HUEY OCASIO APRN         22 


Cephalexin (CEPHALEXIN) 500 Mg Tablet


1 TAB PO BID, #14 TAB


   Prov: HUEY OCASIO APRN         22











HUEY OCASIO APRN             May 9, 2022 21:47

## 2022-05-10 NOTE — RAD
EXAMINATION:  US OB TRANSVAGINAL (FIRST TRIMESTER PELVIC ULTRASOUND)



CLINICAL HISTORY: Abdominal pain in pregnancy.



TECHNIQUE: Sonography of the pelvis was performed by transvaginal technique.



COMPARISON: Pelvic ultrasound 14 2021 





FINDINGS:



Uterus: 

- Size: 9.7 x 5.8 x 5.4 cm

- Myometrium: Homogeneous echotexture

- Cervix: Unremarkable

    

Gestation:

- Intrauterine Gestational Sac: Single present with normal morphologic appearance

- Yolk Sac: Present

- Embryo: Single present  

- Crown Rump Length: 4 mm, corresponding gestational age 6 weeks 0 days      

- Fetal Heart Rate: 113 bpm

- Perigestational Hemorrhage: Absent



Right Ovary: 

- Size: 4.6 x 3.5 x 3.4 cm

- 3.1 x 2.8 x 2.9 cm cyst with internal dependent echogenicity/debris. Normal ovarian blood flow.



Left Ovary: 

- Size: 3.8 x 1.6 x 1.3 cm

- Normal sonographic appearance and blood flow. 



Pelvic Free Fluid: Absent 





IMPRESSION: 



Single live intrauterine gestation with sonographic estimated gestational age 6 weeks 0 days and angélica
mated date of delivery 1/2/2023.



3.1 cm complicated/complex right ovarian cyst as described. 



Electronically signed by: Fritz Williamson DO (5/9/2022 7:11 PM) [facility]